# Patient Record
Sex: MALE | URBAN - METROPOLITAN AREA
[De-identification: names, ages, dates, MRNs, and addresses within clinical notes are randomized per-mention and may not be internally consistent; named-entity substitution may affect disease eponyms.]

---

## 2019-12-06 ENCOUNTER — OFFICE VISIT (OUTPATIENT)
Dept: URBAN - METROPOLITAN AREA CLINIC 58 | Facility: CLINIC | Age: 64
End: 2019-12-06
Payer: COMMERCIAL

## 2019-12-06 DIAGNOSIS — H34.8112 CENTRAL RETINAL VEIN OCCLUSION OF RIGHT EYE, STABLE: ICD-10-CM

## 2019-12-06 DIAGNOSIS — H11.31 SUBCONJUNCTIVAL HEMORRHAGE OF RIGHT EYE: Primary | ICD-10-CM

## 2019-12-06 PROCEDURE — 92004 COMPRE OPH EXAM NEW PT 1/>: CPT | Performed by: OPTOMETRIST

## 2019-12-06 ASSESSMENT — INTRAOCULAR PRESSURE
OD: 11
OS: 11
OD: 10
OS: 13

## 2019-12-06 ASSESSMENT — KERATOMETRY
OD: 44.13
OS: 44.50

## 2019-12-06 NOTE — IMPRESSION/PLAN
Impression: Central retinal vein occlusion of right eye, stable: H34.8112. Plan: Discussed diagnosis in detail with patient. Consult with Bri Marquez MD for eval/treatment.

## 2019-12-06 NOTE — IMPRESSION/PLAN
Impression: Subconjunctival hemorrhage of right eye: H11.31. Plan: Discussed diagnosis in detail with patient. Advised patient of condition. No treatment is required at this time. Advised patient it is harmless. Recommend AT's PRN. Call if [place] worsens.